# Patient Record
Sex: FEMALE | Race: WHITE
[De-identification: names, ages, dates, MRNs, and addresses within clinical notes are randomized per-mention and may not be internally consistent; named-entity substitution may affect disease eponyms.]

---

## 2020-11-17 ENCOUNTER — HOSPITAL ENCOUNTER (EMERGENCY)
Dept: HOSPITAL 26 - MED | Age: 30
Discharge: HOME | End: 2020-11-17
Payer: COMMERCIAL

## 2020-11-17 VITALS — SYSTOLIC BLOOD PRESSURE: 111 MMHG | DIASTOLIC BLOOD PRESSURE: 77 MMHG

## 2020-11-17 VITALS — HEIGHT: 63 IN | BODY MASS INDEX: 21.26 KG/M2 | WEIGHT: 120 LBS

## 2020-11-17 VITALS — DIASTOLIC BLOOD PRESSURE: 77 MMHG | SYSTOLIC BLOOD PRESSURE: 111 MMHG

## 2020-11-17 DIAGNOSIS — S86.911A: Primary | ICD-10-CM
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
## 2020-11-17 NOTE — NUR
Patient discharged with v/s stable. Written and verbal after care instructions 
given and explained. 

Patient alert, oriented and verbalized understanding of instructions. 
Ambulatory with steady gait. All questions addressed prior to discharge. ID 
band removed. Patient advised to follow up with PMD. Rx of NAPROSYN, TYLENOL 
given. Patient educated on indication of medication including possible reaction 
and side effects. Opportunity to ask questions provided and answered.

## 2020-11-17 NOTE — NUR
29 y/o female from home c/o unprovoked right hip pain since 1000 yesterday. Pt 
states pain started upon waking up. Denies trauma/injury. Able to ambluate 
without difficulty. Pt states when she lays down the pain radiates to foot. 
Skin warm, dry, intact. VSS 



medhx: denies

## 2020-11-17 NOTE — NUR
31 YO F BIB SELF C/O RIGHT HIP PAIN SINCE 10AM YESTERDAY. PT DESCRIBES PAIN AS 
10/10, SHARP, RADIATING TO RIGHT FOOT. DENIES ANY TRAUMA/INJURY TO AREA. DENIES 
ANY URINARY SYMPTOMS AND BACK PAIN. IN ER, VSS. PT WALKED TO ROOM SLOWLY BUT 
STEADILY. PT CURRENTLY SITTING ON BED. ERMD MADE AWARE OF PT STATUS. 







PMH: NONE

MEDS: NONE

NKA